# Patient Record
Sex: FEMALE | Race: WHITE | NOT HISPANIC OR LATINO | Employment: FULL TIME | URBAN - METROPOLITAN AREA
[De-identification: names, ages, dates, MRNs, and addresses within clinical notes are randomized per-mention and may not be internally consistent; named-entity substitution may affect disease eponyms.]

---

## 2023-10-28 PROBLEM — J38.1 REINKE'S EDEMA OF VOCAL FOLDS: Status: ACTIVE | Noted: 2023-10-28

## 2023-10-28 PROBLEM — K21.9 GERD (GASTROESOPHAGEAL REFLUX DISEASE): Status: ACTIVE | Noted: 2023-10-28

## 2023-10-28 PROBLEM — J34.89 IRRITATION, NOSE: Status: ACTIVE | Noted: 2023-10-28

## 2023-10-28 PROBLEM — D18.01 HEMANGIOMA OF SKIN AND SUBCUTANEOUS TISSUE: Status: ACTIVE | Noted: 2021-04-01

## 2023-10-28 PROBLEM — L90.5 SCAR CONDITION AND FIBROSIS OF SKIN: Status: ACTIVE | Noted: 2021-04-01

## 2023-10-28 PROBLEM — L81.4 OTHER MELANIN HYPERPIGMENTATION: Status: ACTIVE | Noted: 2021-04-01

## 2023-10-28 PROBLEM — L91.8 OTHER HYPERTROPHIC DISORDERS OF THE SKIN: Status: ACTIVE | Noted: 2021-04-01

## 2023-10-28 PROBLEM — B02.9 HERPES ZOSTER WITHOUT COMPLICATION: Status: ACTIVE | Noted: 2021-04-01

## 2023-10-28 PROBLEM — R49.0 HOARSENESS OF VOICE: Status: ACTIVE | Noted: 2023-10-28

## 2023-10-28 PROBLEM — L82.1 OTHER SEBORRHEIC KERATOSIS: Status: ACTIVE | Noted: 2021-04-01

## 2023-10-28 PROBLEM — J38.7 LEUKOPLAKIA OF LARYNX: Status: ACTIVE | Noted: 2023-10-28

## 2023-10-28 PROBLEM — Z85.828 PERSONAL HISTORY OF OTHER MALIGNANT NEOPLASM OF SKIN: Status: ACTIVE | Noted: 2021-04-01

## 2023-10-28 PROBLEM — J38.3 LESION OF VOCAL CORD: Status: ACTIVE | Noted: 2023-10-28

## 2023-10-28 PROBLEM — L02.11 NECK ABSCESS: Status: ACTIVE | Noted: 2023-10-28

## 2023-10-28 PROBLEM — D22.5 MELANOCYTIC NEVI OF TRUNK: Status: ACTIVE | Noted: 2021-04-01

## 2023-10-28 RX ORDER — OMEPRAZOLE 40 MG/1
1 CAPSULE, DELAYED RELEASE ORAL 2 TIMES DAILY
COMMUNITY
Start: 2019-03-01

## 2023-10-28 RX ORDER — SOD CHLOR,BICARB/SQUEEZ BOTTLE
PACKET, WITH RINSE DEVICE NASAL
COMMUNITY
Start: 2021-10-28 | End: 2023-11-01 | Stop reason: ALTCHOICE

## 2023-11-01 ENCOUNTER — OFFICE VISIT (OUTPATIENT)
Dept: DERMATOLOGY | Facility: CLINIC | Age: 58
End: 2023-11-01
Payer: COMMERCIAL

## 2023-11-01 DIAGNOSIS — D18.01 HEMANGIOMA OF SKIN: ICD-10-CM

## 2023-11-01 DIAGNOSIS — L82.1 SEBORRHEIC KERATOSIS: ICD-10-CM

## 2023-11-01 DIAGNOSIS — Z85.828 PERSONAL HISTORY OF SKIN CANCER: Primary | ICD-10-CM

## 2023-11-01 DIAGNOSIS — L90.5 SCAR CONDITIONS AND FIBROSIS OF SKIN: ICD-10-CM

## 2023-11-01 DIAGNOSIS — L81.4 LENTIGO: ICD-10-CM

## 2023-11-01 DIAGNOSIS — L91.8 SKIN TAG: ICD-10-CM

## 2023-11-01 PROCEDURE — 99213 OFFICE O/P EST LOW 20 MIN: CPT | Performed by: DERMATOLOGY

## 2023-11-01 NOTE — PROGRESS NOTES
Darío Thakkar is a 58 y.o. female who presents for the following: Skin Check and Rash.  Skin Cancer Screening  She has a history of moderate sun exposure. She is in the sun frequently. She uses sunscreen frequently. She reports no skin symptoms. Her moles are not changing.    Spots that concern her: left shoulder (x2) and right leg. Patient states she has a history of SCC        Objective   Well appearing patient in no apparent distress; mood and affect are within normal limits.    A full examination was performed including scalp, head, eyes, ears, nose, lips, neck, chest, axillae, abdomen, back, buttocks, bilateral upper extremities, bilateral lower extremities, hands, feet, fingers, toes, fingernails, and toenails. All findings within normal limits unless otherwise noted below.    Scattered tan macules in sun-exposed areas.    Stuck on verrucous, tan-brown papules and plaques.      Scattered cherry-red papule(s).    Scar is well-healed without evidence of recurrence      Assessment/Plan   Lentigo    The ABCDEs of melanoma and warning signs of non-melanoma skin cancer were discussed with patient and patient expressed understanding. Sun protection and use of at least SPF 30 discussed with patient. Pt instructed to reapply every 2 hours.     Seborrheic keratosis    Hemangioma of skin    Skin tag    Scar conditions and fibrosis of skin      Scribe Attestation  By signing my name below, Leonora CHIANG Scribe   attest that this documentation has been prepared under the direction and in the presence of Allison Langston MD.

## 2024-06-27 ENCOUNTER — APPOINTMENT (OUTPATIENT)
Dept: OTOLARYNGOLOGY | Facility: CLINIC | Age: 59
End: 2024-06-27
Payer: COMMERCIAL

## 2024-08-22 ENCOUNTER — APPOINTMENT (OUTPATIENT)
Dept: OTOLARYNGOLOGY | Facility: CLINIC | Age: 59
End: 2024-08-22
Payer: COMMERCIAL

## 2024-08-22 VITALS — BODY MASS INDEX: 31.58 KG/M2 | HEIGHT: 62 IN | WEIGHT: 171.6 LBS | TEMPERATURE: 97.6 F

## 2024-08-22 DIAGNOSIS — J38.7 LEUKOPLAKIA OF LARYNX: Primary | ICD-10-CM

## 2024-08-22 DIAGNOSIS — R49.0 VOICE HOARSENESS: ICD-10-CM

## 2024-08-22 DIAGNOSIS — J38.1 REINKE'S EDEMA OF VOCAL FOLDS: ICD-10-CM

## 2024-08-22 DIAGNOSIS — R49.8 OTHER VOICE AND RESONANCE DISORDERS: ICD-10-CM

## 2024-08-22 ASSESSMENT — PATIENT HEALTH QUESTIONNAIRE - PHQ9
SUM OF ALL RESPONSES TO PHQ9 QUESTIONS 1 AND 2: 0
2. FEELING DOWN, DEPRESSED OR HOPELESS: NOT AT ALL
1. LITTLE INTEREST OR PLEASURE IN DOING THINGS: NOT AT ALL

## 2024-08-22 NOTE — PROGRESS NOTES
ASSESSMENT AND PLAN:   Audra Thakkar is a 59 y.o. female with a history of history of bilateral leukoplakia, Sb's edema s/p laser surgery. She had benign appearing cysts and vascular changes on the TVCs.       Today's examination to include stroboscopy demonstrates no new concerning masses/lesions. No significant change of leukoplakia. She feels that her voice is stable.     I would like to see the patient back in 1 year. She is taking her PPI and will call for a refill as needed or discuss this with her PCP.         Reason For Consult  No chief complaint on file.    HISTORY OF PRESENT ILLNESS:  Audra Thakkar is a 59 y.o. female presenting for a follow up visit with me for history of bilateral leukoplakia, Sb's edema s/p laser surgery.      The patient reports that she has been doing well. No new voice concerns. She is not taking her PPI prior to her meals.     Last seen 6/29/23: Annual visit for history of bilateral leukoplakia, Sb's edema s/p laser surgery. She had a mucous retention cyst that was benign in appearance.      Past Medical History  She has a past medical history of Personal history of malignant melanoma of skin and Personal history of other diseases of the respiratory system. Surgical History  She has a past surgical history that includes Other surgical history (03/28/2019) and Other surgical history (03/28/2019).   Social History  She has no history on file for tobacco use, alcohol use, and drug use. Allergies  Amoxicillin-pot clavulanate, Clavulanic acid, and Penicillin     Family History  Family History   Problem Relation Name Age of Onset    Testicular cancer Son      Psoriasis Son      Other (viocebox) Other grandmother     Other (esphogus) Other grandmother        Review of Systems  All 10 systems were reviewed and negative except for above.      Last Recorded Vitals  There were no vitals taken for this visit.    Physical Exam  ENT Physical Exam  Constitutional  Appearance: patient  appears well-developed and well-nourished,  Head and Face  Appearance: head appears normal and face appears normal;  Ear  Auricles: right auricle normal; left auricle normal;  Nose  External Nose: nares patent bilaterally;  Oral Cavity/Oropharynx  Lips: normal;  Neck  Neck: neck normal; neck palpation normal;  Respiratory  Inspection: no retractions visible;  Cardiovascular  Inspection: no peripheral edema present;  Neurovestibular  Mental Status: alert and oriented;  Psychiatric: mood normal;  Cranial Nerves: cranial nerves intact;          Procedures   Flexible Laryngoscopy w/ Videostroboscopy    VOICE AND SPEECH CHARACTERISTICS:  Normal spoken speech, (+) mild dysphonia, (+) mild roughness, no breathiness, no asthenia, (+) mild strain.    Intelligibility: normal.   Resonance: balanced.   Vocal Loudness: normal.   Breath Support: normal.    PROCEDURE:    Indications: voice change  PROCEDURE NOTE: FLEXIBLE LARYNGOSCOPY WITH STROBOSCOPY  I recommended a flexible laryngoscopy with stroboscopy based on PE findings, and/or concern for mucosal wave details based upon history and/or for issues associated with hyperreflexic gag on mirror exam concerning for pathology. Risks, benefits, and alternatives were explained. The patient wishes to proceed and gives verbal consent.   Patient is seated in the exam chair. After adequate topical anesthesia, I advance the flexible endoscope. The examination included evaluation of the willis, vallecula, base of tongue, pyriforms, post-cricoid area, larynx and immediate subglottis.  Findings : assessment of the nasopharynx, base of tongue/vallecula, pyriform sinuses, post-cricoid area and pharyngeal walls was without lesion or mass, pharyngeal wall contraction is normal and symmetric, and no pooling of secretions  ventricular obliteration: 4 (complete), erythema/hyperemia: 4 (complete), IA thickenin (moderate), TVC edema: 2 (moderate), and diffuse laryngitis: 3 (severe)  Gross  Arytenoid Movement: symmetric.  Arytenoid Height: normal.   Supraglottic Tension: lateral.  Symmetry: asymmetry.   Amplitude: reduced: left.  Phase Closure: in-phase.  Mucosal Wave Lateral Excursion/Secondary Wave: Bilateral Vocal Cord: mild restriction - Wave moved more than ¼, less than ½ the width of the vocal fold.  Periodicity: aperiodic.  Closure: irregular.      Time Spent  Prep time on day of patient encounter: 10 minutes  Time spent directly with patient, family or caregiver: 15 minutes  Additional Time Spent on Patient Care Activities/Discussion with SLP re care plan: 5 minutes  Documentation Time: 10 minutes  Other Time Spent: 0 minutes  Total: 40 minutes       Scribe Attestation  By signing my name below, I, Leti Wellington , Scribcarly attest that this documentation has been prepared under the direction and in the presence of Tony Varma MD.

## 2024-10-14 ENCOUNTER — LAB (OUTPATIENT)
Dept: LAB | Facility: LAB | Age: 59
End: 2024-10-14

## 2024-10-14 ENCOUNTER — APPOINTMENT (OUTPATIENT)
Dept: PRIMARY CARE | Facility: CLINIC | Age: 59
End: 2024-10-14
Payer: COMMERCIAL

## 2024-10-14 VITALS — SYSTOLIC BLOOD PRESSURE: 92 MMHG | WEIGHT: 173 LBS | BODY MASS INDEX: 31.64 KG/M2 | DIASTOLIC BLOOD PRESSURE: 70 MMHG

## 2024-10-14 DIAGNOSIS — E78.2 MIXED HYPERLIPIDEMIA: Primary | ICD-10-CM

## 2024-10-14 DIAGNOSIS — R73.03 PRE-DIABETES: ICD-10-CM

## 2024-10-14 DIAGNOSIS — Z13.220 LIPID SCREENING: ICD-10-CM

## 2024-10-14 DIAGNOSIS — J38.7 LEUKOPLAKIA OF LARYNX: ICD-10-CM

## 2024-10-14 DIAGNOSIS — K21.9 GASTROESOPHAGEAL REFLUX DISEASE, UNSPECIFIED WHETHER ESOPHAGITIS PRESENT: ICD-10-CM

## 2024-10-14 DIAGNOSIS — R00.2 PALPITATION: ICD-10-CM

## 2024-10-14 DIAGNOSIS — F17.210 CIGARETTE NICOTINE DEPENDENCE WITHOUT COMPLICATION: ICD-10-CM

## 2024-10-14 DIAGNOSIS — Z00.00 HEALTHCARE MAINTENANCE: ICD-10-CM

## 2024-10-14 DIAGNOSIS — E78.2 MIXED HYPERLIPIDEMIA: ICD-10-CM

## 2024-10-14 DIAGNOSIS — Z00.00 HEALTH CARE MAINTENANCE: ICD-10-CM

## 2024-10-14 DIAGNOSIS — F41.9 ANXIETY: ICD-10-CM

## 2024-10-14 LAB
ALBUMIN SERPL BCP-MCNC: 4.6 G/DL (ref 3.4–5)
ALP SERPL-CCNC: 85 U/L (ref 33–136)
ALT SERPL W P-5'-P-CCNC: 20 U/L (ref 7–45)
ANION GAP SERPL CALC-SCNC: 14 MMOL/L (ref 10–20)
AST SERPL W P-5'-P-CCNC: 16 U/L (ref 9–39)
BILIRUB SERPL-MCNC: 0.4 MG/DL (ref 0–1.2)
BUN SERPL-MCNC: 14 MG/DL (ref 6–23)
CALCIUM SERPL-MCNC: 10.1 MG/DL (ref 8.6–10.6)
CHLORIDE SERPL-SCNC: 102 MMOL/L (ref 98–107)
CHOLEST SERPL-MCNC: 234 MG/DL (ref 0–199)
CHOLESTEROL/HDL RATIO: 5.8
CO2 SERPL-SCNC: 29 MMOL/L (ref 21–32)
CREAT SERPL-MCNC: 0.69 MG/DL (ref 0.5–1.05)
EGFRCR SERPLBLD CKD-EPI 2021: >90 ML/MIN/1.73M*2
ERYTHROCYTE [DISTWIDTH] IN BLOOD BY AUTOMATED COUNT: 14.4 % (ref 11.5–14.5)
EST. AVERAGE GLUCOSE BLD GHB EST-MCNC: 123 MG/DL
GLUCOSE SERPL-MCNC: 66 MG/DL (ref 74–99)
HBA1C MFR BLD: 5.9 %
HCT VFR BLD AUTO: 54.2 % (ref 36–46)
HDLC SERPL-MCNC: 40.4 MG/DL
HGB BLD-MCNC: 16.9 G/DL (ref 12–16)
LDLC SERPL CALC-MCNC: 156 MG/DL
MCH RBC QN AUTO: 29.7 PG (ref 26–34)
MCHC RBC AUTO-ENTMCNC: 31.2 G/DL (ref 32–36)
MCV RBC AUTO: 95 FL (ref 80–100)
NON HDL CHOLESTEROL: 194 MG/DL (ref 0–149)
NRBC BLD-RTO: 0 /100 WBCS (ref 0–0)
PLATELET # BLD AUTO: 241 X10*3/UL (ref 150–450)
POTASSIUM SERPL-SCNC: 4.8 MMOL/L (ref 3.5–5.3)
PROT SERPL-MCNC: 7.5 G/DL (ref 6.4–8.2)
RBC # BLD AUTO: 5.69 X10*6/UL (ref 4–5.2)
SODIUM SERPL-SCNC: 140 MMOL/L (ref 136–145)
TRIGL SERPL-MCNC: 189 MG/DL (ref 0–149)
TSH SERPL-ACNC: 1.92 MIU/L (ref 0.44–3.98)
VLDL: 38 MG/DL (ref 0–40)
WBC # BLD AUTO: 12.6 X10*3/UL (ref 4.4–11.3)

## 2024-10-14 PROCEDURE — 85027 COMPLETE CBC AUTOMATED: CPT

## 2024-10-14 PROCEDURE — 36415 COLL VENOUS BLD VENIPUNCTURE: CPT

## 2024-10-14 PROCEDURE — 90656 IIV3 VACC NO PRSV 0.5 ML IM: CPT | Performed by: STUDENT IN AN ORGANIZED HEALTH CARE EDUCATION/TRAINING PROGRAM

## 2024-10-14 PROCEDURE — 83036 HEMOGLOBIN GLYCOSYLATED A1C: CPT

## 2024-10-14 PROCEDURE — 84443 ASSAY THYROID STIM HORMONE: CPT

## 2024-10-14 PROCEDURE — 80061 LIPID PANEL: CPT

## 2024-10-14 PROCEDURE — 80053 COMPREHEN METABOLIC PANEL: CPT

## 2024-10-14 PROCEDURE — 99204 OFFICE O/P NEW MOD 45 MIN: CPT | Performed by: STUDENT IN AN ORGANIZED HEALTH CARE EDUCATION/TRAINING PROGRAM

## 2024-10-14 PROCEDURE — 90471 IMMUNIZATION ADMIN: CPT | Performed by: STUDENT IN AN ORGANIZED HEALTH CARE EDUCATION/TRAINING PROGRAM

## 2024-10-14 ASSESSMENT — PATIENT HEALTH QUESTIONNAIRE - PHQ9
1. LITTLE INTEREST OR PLEASURE IN DOING THINGS: NOT AT ALL
SUM OF ALL RESPONSES TO PHQ9 QUESTIONS 1 AND 2: 0
2. FEELING DOWN, DEPRESSED OR HOPELESS: NOT AT ALL

## 2024-10-14 NOTE — PROGRESS NOTES
"HPI:  Initial PCP visit.    Audra Thakkar is a 59 y.o. female with PMH GERD, basal cell carcinoma, and leukoplakia of larynx s/p surgery (follows with  ENT) here to establish care. Denies any acute complaints. Does note feeling her \"heart racing\" in her throat when she lays down for bed, ongoing for the last few months. Thinks it may be related to stress and anxiety. Denies any CP/SOB/PND.     Accompanied by her .    12-point review of systems reviewed and negative except as mentioned in HPI.     Medications:  Medication Documentation Review Audit       Reviewed by Rizwana Roberson MA (Medical Assistant) on 10/14/24 at 1358      Medication Order Taking? Sig Documenting Provider Last Dose Status   omeprazole (PriLOSEC) 40 mg DR capsule [id number] Yes Take 1 capsule (40 mg) by mouth 2 times a day. Historical Provider, MD Taking Active                    Allergies:  Allergies   Allergen Reactions    Amoxicillin-Pot Clavulanate Unknown    Cephalexin GI Upset and Other     nausea    Clavulanic Acid Itching    Penicillin Unknown       Past medical history:  Past Medical History:   Diagnosis Date    Personal history of malignant melanoma of skin     History of malignant melanoma of skin    Personal history of other diseases of the respiratory system     History of bronchitis       Surgical history:  Past Surgical History:   Procedure Laterality Date    OTHER SURGICAL HISTORY  2019    Tubal ligation    OTHER SURGICAL HISTORY  2019    Tonsillectomy with adenoidectomy       Family history:  Family History   Problem Relation Name Age of Onset    Testicular cancer Son      Psoriasis Son      Other (viocebox) Other grandmother     Other (esphogus) Other grandmother    -Mother:  from lung cancer     Social history:  -Living situation: Lives at home with   -Functional status: Independent  -Does accounting work   -Has 3 sons (1  of overdose, 2 sons alive & local)  -Tobacco: 5-8 cigarettes per day, " "used to smoke 1.5-2ppd  -Alcohol: socially  -Drugs: denies    Vitals:  Vitals:    10/14/24 1356   BP: 92/70       Physical exam:  GEN: conversant, NAD  HEENT: PERRL, EOMI, MMM  NECK: supple, no carotid bruits appreciated b/l  CV: S1, S2, RRR  PULM: CTAB  ABD: soft, NT, ND  NEURO: no new gross focal deficits  EXT: no sig LE edema  PSYCH: appropriate affect    Labs:  No results found for this or any previous visit (from the past 24 hour(s)).    Imaging:  No results found.    Assessment and plan:  #DLD  #Nocturnal palpitations  :: Significant cardiac history in family: father with open heart surgery, brother with MI, brother with stroke age 50-60s  :: RRR on exam, states had prior ischemic eval with stress test & cath \"many years ago\", unable to access that record  :: 02/2023 , ,   -encourage diet & lifestyle modifications  -repeat labs  -cardiology referral     #Prediabetes  :: 02/2023 A1c 6.2%  -encourage diet & lifestyle modifications  -repeat labs    #GERD  -continue omeprazole    #Basal cell carcinoma   #Leukoplakia of larynx   -follows with derm & ENT    #Health Maintenance: routine labs ordered. Longitudinal care.  Cancer:  Colon cancer: FIT negative 03/2024  Mammogram 07/2024: negative  Lung: defers at this time    Vaccines:  Shingles: defers at this time  COVID: received 1x vaccine  Flu: received 10/2024    RTC in 6 months.    Patient discussed with attending physician Dr. Gale who agrees with the plan as outlined above.    Anbael Betancourt MD  Internal Medicine PGY2     Trainee role: Resident    I saw and evaluated the patient. I personally obtained the key and critical portions of the history and physical exam or was physically present for key and critical portions performed by the trainee. I reviewed the trainee's documentation and discussed the patient with the trainee. I agree with the trainee's medical decision making as documented on the trainee's notes.    Jose Maria Gale, " M.D.

## 2024-10-15 DIAGNOSIS — E78.2 MIXED HYPERLIPIDEMIA: Primary | ICD-10-CM

## 2024-10-15 RX ORDER — ROSUVASTATIN CALCIUM 20 MG/1
20 TABLET, COATED ORAL DAILY
Qty: 100 TABLET | Refills: 3 | Status: SHIPPED | OUTPATIENT
Start: 2024-10-15 | End: 2025-11-19

## 2024-11-06 ENCOUNTER — APPOINTMENT (OUTPATIENT)
Dept: CARDIOLOGY | Facility: CLINIC | Age: 59
End: 2024-11-06
Payer: COMMERCIAL

## 2024-11-06 ENCOUNTER — ANCILLARY PROCEDURE (OUTPATIENT)
Dept: CARDIOLOGY | Facility: CLINIC | Age: 59
End: 2024-11-06
Payer: COMMERCIAL

## 2024-11-06 VITALS
HEIGHT: 63 IN | SYSTOLIC BLOOD PRESSURE: 126 MMHG | WEIGHT: 168.2 LBS | BODY MASS INDEX: 29.8 KG/M2 | OXYGEN SATURATION: 94 % | HEART RATE: 97 BPM | DIASTOLIC BLOOD PRESSURE: 70 MMHG

## 2024-11-06 DIAGNOSIS — R07.9 CHEST PAIN, UNSPECIFIED TYPE: Primary | ICD-10-CM

## 2024-11-06 DIAGNOSIS — Z72.0 TOBACCO USE: ICD-10-CM

## 2024-11-06 DIAGNOSIS — R94.31 ABNORMAL EKG: ICD-10-CM

## 2024-11-06 DIAGNOSIS — R00.2 PALPITATION: ICD-10-CM

## 2024-11-06 LAB — BODY SURFACE AREA: 1.83 M2

## 2024-11-06 PROCEDURE — 3008F BODY MASS INDEX DOCD: CPT | Performed by: STUDENT IN AN ORGANIZED HEALTH CARE EDUCATION/TRAINING PROGRAM

## 2024-11-06 PROCEDURE — 99204 OFFICE O/P NEW MOD 45 MIN: CPT | Performed by: STUDENT IN AN ORGANIZED HEALTH CARE EDUCATION/TRAINING PROGRAM

## 2024-11-06 PROCEDURE — 93000 ELECTROCARDIOGRAM COMPLETE: CPT | Performed by: STUDENT IN AN ORGANIZED HEALTH CARE EDUCATION/TRAINING PROGRAM

## 2024-11-06 PROCEDURE — 93242 EXT ECG>48HR<7D RECORDING: CPT

## 2024-11-06 NOTE — PROGRESS NOTES
Referred by Dr. Gale for Palpitations     History Of Present Illness:    Audra Thakkar is a 59 y.o. female past medical history notable for chronic tobacco consumption, prediabetes, GERD and hyperlipidemia referred to us due to palpitations.  Patient has noticed that she has palpitations at night when she sleeps.  These are sporadic episodes she can feel fluttering her heart.  Is unclear if this is related to the position she sleeps.  She denies heavy ethanol consumption.  She is a current smoker and smokes about a carton every 2 weeks.  She has not been sick.  Denies cough fevers or chills.    She has had 2 separate episodes of chest tightness that have happened in the middle of the night.  1 episode woke her up and is unclear if this is related to GERD.  She felt pressure grabbing sensation in her heart.  She has a associated shortness of breath.  This happened 2 separate times.  Most recent labs reviewed.  LDL is 156 and she is now on rosuvastatin 20 mg daily.  Baseline ECG is notable for sinus rhythm with sinus arrhythmia borderline left posterior fascicular block.      Past Medical History:  She has a past medical history of Personal history of malignant melanoma of skin and Personal history of other diseases of the respiratory system.    Past Surgical History:  She has a past surgical history that includes Other surgical history (03/28/2019) and Other surgical history (03/28/2019).      Social History:  She reports that she has been smoking cigarettes. She has never used smokeless tobacco. No history on file for alcohol use and drug use.    Family History:  Family History   Problem Relation Name Age of Onset    Testicular cancer Son      Psoriasis Son      Other (viocebox) Other grandmother     Other (esphogus) Other grandmother         Allergies:  Amoxicillin-pot clavulanate, Cephalexin, Clavulanic acid, and Penicillin    Outpatient Medications:  Current Outpatient Medications   Medication Instructions     "omeprazole (PriLOSEC) 40 mg DR capsule 1 capsule, 2 times daily    rosuvastatin (CRESTOR) 20 mg, oral, Daily        Last Recorded Vitals:  Vitals:    11/06/24 0823   BP: 126/70   BP Location: Left arm   Patient Position: Sitting   BP Cuff Size: Adult   Pulse: 97   SpO2: 94%   Weight: 76.3 kg (168 lb 3.2 oz)   Height: 1.588 m (5' 2.5\")       Physical Exam:  General: No acute distress,  A&O x3  Skin: Warm and dry  Neck: JVD is not elevated  ENT: Moist mucous membranes no lesions appreciated  Pulmonary: Breath sounds are diminished bilaterally but otherwise clear to auscultation  Cards: Regular rate rhythm, no murmurs gallops or rubs appreciated normal S1-S2  Abdomen: Soft nontender nondistended  Extremities: No edema or cyanosis  Psych: Appropriate mood and affect          Last Labs:  CBC -  Lab Results   Component Value Date    WBC 12.6 (H) 10/14/2024    HGB 16.9 (H) 10/14/2024    HCT 54.2 (H) 10/14/2024    MCV 95 10/14/2024     10/14/2024       CMP -  Lab Results   Component Value Date    CALCIUM 10.1 10/14/2024    PHOS 4.5 02/15/2019    PROT 7.5 10/14/2024    ALBUMIN 4.6 10/14/2024    AST 16 10/14/2024    ALT 20 10/14/2024    ALKPHOS 85 10/14/2024    BILITOT 0.4 10/14/2024       LIPID PANEL -   Lab Results   Component Value Date    CHOL 234 (H) 10/14/2024    TRIG 189 (H) 10/14/2024    HDL 40.4 10/14/2024    CHHDL 5.8 10/14/2024    VLDL 38 10/14/2024    NHDL 194 (H) 10/14/2024       RENAL FUNCTION PANEL -   Lab Results   Component Value Date    GLUCOSE 66 (L) 10/14/2024     10/14/2024    K 4.8 10/14/2024     10/14/2024    CO2 29 10/14/2024    ANIONGAP 14 10/14/2024    BUN 14 10/14/2024    CREATININE 0.69 10/14/2024    CALCIUM 10.1 10/14/2024    PHOS 4.5 02/15/2019    ALBUMIN 4.6 10/14/2024        Lab Results   Component Value Date    HGBA1C 5.9 (H) 10/14/2024       Assessment/Plan     Palpitations: Mostly occurring at night.  No clear exacerbating or alleviating factors.  Worsening over the last " several weeks to months.  Baseline ECG reviewed.  Baseline labs reviewed.  For further workup will provide Holter monitor and echocardiogram.  Additional recs to follow based on study findings.    2.  Chest pain: Typical and atypical symptoms.  Potentially related to GERD patient has risk factors which include chronic tobacco consumption, prediabetes and hyperlipidemia.  Baseline ECG reviewed.  Recently placed on rosuvastatin for risk modification.  For further risk stratification we will send patient for stress echocardiogram.  Additional recs will follow based on study findings.    Stress echocardiogram  7-day Holter monitor  Echocardiogram  Follow-up once testing is completed    (This note was generated with voice recognition software and may contain errors including spelling, grammar, syntax and missed recognition of what was dictated, of which may not have been fully corrected)     Ruperto Sandoval MD PhD

## 2024-11-15 DIAGNOSIS — E78.2 MIXED HYPERLIPIDEMIA: ICD-10-CM

## 2024-11-15 RX ORDER — ROSUVASTATIN CALCIUM 20 MG/1
TABLET, COATED ORAL
Refills: 0 | OUTPATIENT
Start: 2024-11-15

## 2024-11-18 DIAGNOSIS — E78.2 MIXED HYPERLIPIDEMIA: ICD-10-CM

## 2024-11-18 RX ORDER — ROSUVASTATIN CALCIUM 20 MG/1
20 TABLET, COATED ORAL DAILY
Qty: 100 TABLET | Refills: 3 | Status: SHIPPED | OUTPATIENT
Start: 2024-11-18 | End: 2025-12-23

## 2024-11-24 LAB — BODY SURFACE AREA: 1.83 M2

## 2024-11-24 PROCEDURE — 93244 EXT ECG>48HR<7D REV&INTERPJ: CPT | Performed by: INTERNAL MEDICINE

## 2024-12-02 DIAGNOSIS — K21.9 GASTROESOPHAGEAL REFLUX DISEASE, UNSPECIFIED WHETHER ESOPHAGITIS PRESENT: ICD-10-CM

## 2024-12-03 ENCOUNTER — HOSPITAL ENCOUNTER (OUTPATIENT)
Dept: CARDIOLOGY | Facility: CLINIC | Age: 59
Discharge: HOME | End: 2024-12-03
Payer: COMMERCIAL

## 2024-12-03 DIAGNOSIS — R00.2 PALPITATION: ICD-10-CM

## 2024-12-03 DIAGNOSIS — R94.31 ABNORMAL EKG: ICD-10-CM

## 2024-12-03 DIAGNOSIS — R07.89 OTHER CHEST PAIN: ICD-10-CM

## 2024-12-03 DIAGNOSIS — R07.9 CHEST PAIN, UNSPECIFIED TYPE: ICD-10-CM

## 2024-12-03 DIAGNOSIS — Z72.0 TOBACCO USE: ICD-10-CM

## 2024-12-03 PROCEDURE — 2500000004 HC RX 250 GENERAL PHARMACY W/ HCPCS (ALT 636 FOR OP/ED): Performed by: STUDENT IN AN ORGANIZED HEALTH CARE EDUCATION/TRAINING PROGRAM

## 2024-12-03 PROCEDURE — 93016 CV STRESS TEST SUPVJ ONLY: CPT | Performed by: STUDENT IN AN ORGANIZED HEALTH CARE EDUCATION/TRAINING PROGRAM

## 2024-12-03 PROCEDURE — 93350 STRESS TTE ONLY: CPT | Performed by: STUDENT IN AN ORGANIZED HEALTH CARE EDUCATION/TRAINING PROGRAM

## 2024-12-03 PROCEDURE — C8929 TTE W OR WO FOL WCON,DOPPLER: HCPCS

## 2024-12-03 PROCEDURE — 93306 TTE W/DOPPLER COMPLETE: CPT | Performed by: STUDENT IN AN ORGANIZED HEALTH CARE EDUCATION/TRAINING PROGRAM

## 2024-12-03 PROCEDURE — 93018 CV STRESS TEST I&R ONLY: CPT | Performed by: STUDENT IN AN ORGANIZED HEALTH CARE EDUCATION/TRAINING PROGRAM

## 2024-12-03 PROCEDURE — C8928 TTE W OR W/O FOL W/CON,STRES: HCPCS

## 2024-12-05 ENCOUNTER — TELEPHONE (OUTPATIENT)
Dept: CARDIOLOGY | Facility: CLINIC | Age: 59
End: 2024-12-05
Payer: COMMERCIAL

## 2024-12-05 NOTE — TELEPHONE ENCOUNTER
----- Message from Ruperto Sandoval sent at 12/4/2024  4:25 PM EST -----  Notify patient of normal stress test results. Audra Thakkar  can follow up as scheduled.

## 2024-12-06 LAB
AORTIC VALVE MEAN GRADIENT: 3 MMHG
AORTIC VALVE PEAK VELOCITY: 1.33 M/S
AV PEAK GRADIENT: 7 MMHG
AVA (PEAK VEL): 2.15 CM2
AVA (VTI): 2.48 CM2
EJECTION FRACTION APICAL 4 CHAMBER: 67.5
EJECTION FRACTION: 58 %
LEFT VENTRICLE INTERNAL DIMENSION DIASTOLE: 4.16 CM (ref 3.5–6)
LEFT VENTRICULAR OUTFLOW TRACT DIAMETER: 1.96 CM
MITRAL VALVE E/A RATIO: 0.81
RIGHT VENTRICLE FREE WALL PEAK S': 12 CM/S
TRICUSPID ANNULAR PLANE SYSTOLIC EXCURSION: 2.5 CM

## 2024-12-10 RX ORDER — OMEPRAZOLE 40 MG/1
40 CAPSULE, DELAYED RELEASE ORAL 2 TIMES DAILY
Qty: 180 CAPSULE | Refills: 0 | Status: SHIPPED | OUTPATIENT
Start: 2024-12-10 | End: 2025-03-10

## 2024-12-11 RX ORDER — OMEPRAZOLE 40 MG/1
CAPSULE, DELAYED RELEASE ORAL
Refills: 0 | OUTPATIENT
Start: 2024-12-11

## 2025-02-26 ENCOUNTER — APPOINTMENT (OUTPATIENT)
Dept: CARDIOLOGY | Facility: CLINIC | Age: 60
End: 2025-02-26
Payer: COMMERCIAL

## 2025-02-26 VITALS
BODY MASS INDEX: 31.1 KG/M2 | WEIGHT: 169 LBS | SYSTOLIC BLOOD PRESSURE: 142 MMHG | DIASTOLIC BLOOD PRESSURE: 88 MMHG | HEART RATE: 85 BPM | HEIGHT: 62 IN | OXYGEN SATURATION: 93 %

## 2025-02-26 DIAGNOSIS — Z72.0 TOBACCO USE: ICD-10-CM

## 2025-02-26 DIAGNOSIS — R00.2 PALPITATION: ICD-10-CM

## 2025-02-26 DIAGNOSIS — I10 ESSENTIAL HYPERTENSION: ICD-10-CM

## 2025-02-26 DIAGNOSIS — R07.9 CHEST PAIN, UNSPECIFIED TYPE: Primary | ICD-10-CM

## 2025-02-26 DIAGNOSIS — R94.31 ABNORMAL EKG: ICD-10-CM

## 2025-02-26 DIAGNOSIS — I47.10 PAROXYSMAL SUPRAVENTRICULAR TACHYCARDIA (CMS-HCC): ICD-10-CM

## 2025-02-26 PROCEDURE — 3008F BODY MASS INDEX DOCD: CPT | Performed by: STUDENT IN AN ORGANIZED HEALTH CARE EDUCATION/TRAINING PROGRAM

## 2025-02-26 PROCEDURE — 93000 ELECTROCARDIOGRAM COMPLETE: CPT | Performed by: STUDENT IN AN ORGANIZED HEALTH CARE EDUCATION/TRAINING PROGRAM

## 2025-02-26 PROCEDURE — 3079F DIAST BP 80-89 MM HG: CPT | Performed by: STUDENT IN AN ORGANIZED HEALTH CARE EDUCATION/TRAINING PROGRAM

## 2025-02-26 PROCEDURE — 3077F SYST BP >= 140 MM HG: CPT | Performed by: STUDENT IN AN ORGANIZED HEALTH CARE EDUCATION/TRAINING PROGRAM

## 2025-02-26 PROCEDURE — 99214 OFFICE O/P EST MOD 30 MIN: CPT | Performed by: STUDENT IN AN ORGANIZED HEALTH CARE EDUCATION/TRAINING PROGRAM

## 2025-02-26 NOTE — PROGRESS NOTES
"Chief Complaint:   Chest Pain and Palpitations (Follow up after testing and holter)     History Of Present Illness:    Audra Thakkar is a 59 y.o. female returns for follow-up appointment.  Patient wore Holter monitor which showed no evidence of atrial fibrillation but she had episodes of paroxysmal SVT.  She knows her heart rate will speed up at times very briefly but she states the symptoms do not impact her daily living.  She underwent stress echocardiogram at which time she is able to exercise 2 to 4 minutes achieving appropriate max and no evidence of ischemia by EKG or echocardiogram.  She has a episodes of on and off atypical chest discomfort.  Today she had chest discomfort and her EKG was notable for sinus rhythm she has had bouts of bronchitis more recently     Last Recorded Vitals:  Vitals:    02/26/25 0943   BP: 142/88   BP Location: Left arm   Patient Position: Sitting   BP Cuff Size: Adult   Pulse: 85   SpO2: 93%   Weight: 76.7 kg (169 lb)   Height: 1.575 m (5' 2\")       Review of Systems  ROS      Allergies:  Amoxicillin-pot clavulanate, Cephalexin, Clavulanic acid, and Penicillin    Outpatient Medications:  Current Outpatient Medications   Medication Instructions    omeprazole (PRILOSEC) 40 mg, oral, 2 times daily    rosuvastatin (CRESTOR) 20 mg, oral, Daily       Physical Exam:  General: No acute distress,  A&O x3  Skin: Warm and dry  Neck: JVD is not elevated  ENT: Moist mucous membranes no lesions appreciated  Pulmonary: CTAB  Cards: Regular rate rhythm, no murmurs gallops or rubs appreciated normal S1-S2  Abdomen: Soft nontender nondistended  Extremities: No edema or cyanosis  Psych: Appropriate mood and affect        Last Labs:  CBC -  Lab Results   Component Value Date    WBC 12.6 (H) 10/14/2024    HGB 16.9 (H) 10/14/2024    HCT 54.2 (H) 10/14/2024    MCV 95 10/14/2024     10/14/2024       CMP -  Lab Results   Component Value Date    CALCIUM 10.1 10/14/2024    PHOS 4.5 02/15/2019    PROT " 7.5 10/14/2024    ALBUMIN 4.6 10/14/2024    AST 16 10/14/2024    ALT 20 10/14/2024    ALKPHOS 85 10/14/2024    BILITOT 0.4 10/14/2024       LIPID PANEL -   Lab Results   Component Value Date    CHOL 234 (H) 10/14/2024    TRIG 189 (H) 10/14/2024    HDL 40.4 10/14/2024    CHHDL 5.8 10/14/2024    VLDL 38 10/14/2024    NHDL 194 (H) 10/14/2024       RENAL FUNCTION PANEL -   Lab Results   Component Value Date    GLUCOSE 66 (L) 10/14/2024     10/14/2024    K 4.8 10/14/2024     10/14/2024    CO2 29 10/14/2024    ANIONGAP 14 10/14/2024    BUN 14 10/14/2024    CREATININE 0.69 10/14/2024    CALCIUM 10.1 10/14/2024    PHOS 4.5 02/15/2019    ALBUMIN 4.6 10/14/2024        Lab Results   Component Value Date    HGBA1C 5.9 (H) 10/14/2024       Last Cardiology Tests:  ECG:  Encounter Date: 11/06/24   ECG 12 lead (Clinic Performed)    Narrative    Sinus rhythm with sinus arrhythmia and borderline left posterior   fascicular block.        Echo:  No results found for this or any previous visit from the past 1095 days.       Ejection Fractions:  EF   Date/Time Value Ref Range Status   12/03/2024 02:45 PM 58 %      Assessment and Plan    Paroxysmal SVT: Longest documented episode of 11 beats.  Mostly occurring at night.  No clear exacerbating or alleviating factors.  Worsening over the last several weeks to months.  Baseline ECG reviewed.  Baseline labs reviewed.  Stress test results reviewed.  Offered pharmacotherapy for which patient declined.       2.  Chest pain: Typical and atypical symptoms.  Potentially related to GERD patient has risk factors which include chronic tobacco consumption, prediabetes and hyperlipidemia.  Baseline ECG reviewed.  Recently placed on rosuvastatin for risk modification.    -Stress echocardiogram showed limited functional capacity but able to achieve 4 METS and no ischemia by EKG or stress echocardiogram.  -EKG today shows normal sinus rhythm  -Calcium score ordered    3.  Tobacco consumption:  Precontemplative.  Advised smoking cessation    Follow-up in 6     (This note was generated with voice recognition software and may contain errors including spelling, grammar, syntax and missed recognition of what was dictated, of which may not have been fully corrected)     Ruperto Sandoval MD PhD

## 2025-03-11 ENCOUNTER — HOSPITAL ENCOUNTER (OUTPATIENT)
Dept: RADIOLOGY | Facility: CLINIC | Age: 60
Discharge: HOME | End: 2025-03-11
Payer: COMMERCIAL

## 2025-03-11 DIAGNOSIS — R00.2 PALPITATION: ICD-10-CM

## 2025-03-11 DIAGNOSIS — R07.9 CHEST PAIN, UNSPECIFIED TYPE: ICD-10-CM

## 2025-03-11 DIAGNOSIS — I10 ESSENTIAL HYPERTENSION: ICD-10-CM

## 2025-03-11 PROCEDURE — 75571 CT HRT W/O DYE W/CA TEST: CPT

## 2025-03-26 ENCOUNTER — TELEPHONE (OUTPATIENT)
Dept: DERMATOLOGY | Facility: CLINIC | Age: 60
End: 2025-03-26
Payer: COMMERCIAL

## 2025-03-26 DIAGNOSIS — B00.9 HERPESVIRAL INFECTION: Primary | ICD-10-CM

## 2025-03-26 RX ORDER — VALACYCLOVIR HYDROCHLORIDE 1 G/1
1000 TABLET, FILM COATED ORAL 2 TIMES DAILY
Qty: 14 TABLET | Refills: 0 | Status: SHIPPED | OUTPATIENT
Start: 2025-03-26 | End: 2025-04-02

## 2025-03-26 NOTE — TELEPHONE ENCOUNTER
Called pt back in regards to VM left. Pt recently contracted COVID last week and multiple cold sores popped up. Confirmed current medications and pharmacy with pt. Pt stated that she is using abreva cold sore cream which has helped slightly. Informed pt I will let Dr. Langston know everything that has been discussed

## 2025-04-11 DIAGNOSIS — K21.9 GASTROESOPHAGEAL REFLUX DISEASE, UNSPECIFIED WHETHER ESOPHAGITIS PRESENT: ICD-10-CM

## 2025-04-14 ENCOUNTER — APPOINTMENT (OUTPATIENT)
Dept: PRIMARY CARE | Facility: CLINIC | Age: 60
End: 2025-04-14

## 2025-04-14 VITALS — SYSTOLIC BLOOD PRESSURE: 112 MMHG | DIASTOLIC BLOOD PRESSURE: 70 MMHG

## 2025-04-14 DIAGNOSIS — E78.2 MIXED HYPERLIPIDEMIA: ICD-10-CM

## 2025-04-14 DIAGNOSIS — F17.210 CIGARETTE NICOTINE DEPENDENCE WITHOUT COMPLICATION: Primary | ICD-10-CM

## 2025-04-14 DIAGNOSIS — R73.03 PRE-DIABETES: ICD-10-CM

## 2025-04-14 DIAGNOSIS — J43.9 PULMONARY EMPHYSEMA, UNSPECIFIED EMPHYSEMA TYPE (MULTI): ICD-10-CM

## 2025-04-14 DIAGNOSIS — K21.9 GASTROESOPHAGEAL REFLUX DISEASE, UNSPECIFIED WHETHER ESOPHAGITIS PRESENT: ICD-10-CM

## 2025-04-14 PROCEDURE — 99214 OFFICE O/P EST MOD 30 MIN: CPT | Performed by: STUDENT IN AN ORGANIZED HEALTH CARE EDUCATION/TRAINING PROGRAM

## 2025-04-14 PROCEDURE — 3078F DIAST BP <80 MM HG: CPT | Performed by: STUDENT IN AN ORGANIZED HEALTH CARE EDUCATION/TRAINING PROGRAM

## 2025-04-14 PROCEDURE — 3074F SYST BP LT 130 MM HG: CPT | Performed by: STUDENT IN AN ORGANIZED HEALTH CARE EDUCATION/TRAINING PROGRAM

## 2025-04-14 RX ORDER — OMEPRAZOLE 40 MG/1
40 CAPSULE, DELAYED RELEASE ORAL 2 TIMES DAILY
Qty: 180 CAPSULE | Refills: 1 | Status: SHIPPED | OUTPATIENT
Start: 2025-04-14 | End: 2025-10-11

## 2025-04-14 NOTE — PROGRESS NOTES
Subjective   Patient ID: Audra Thakkar is a 59 y.o. female who presents for Follow-up.    HPI   Here for follow up.  Has been tolerating the statin, no muscle aches.  States had prior PFTs that were normal, does not use inhaler  Gets exercise walking outside and on treadmilll and tries to get steps in at work.    Accompanied by her .        Review of Systems  12 point ROS reviewed and otherwise negative except as stated above.      Objective   /70     Physical Exam  Constitutional: Well-developed female in no acute distress.  HEENT: NCAT. EOMI.   Respiratory: CTAB. No wheezes, crackles, or rhonchi. Normal respiratory effort on RA.  Cardiovascular: RRR, normal S1/S2, No murmurs, rubs, or gallops.   Neuro: CN II-XII grossly intact. Moving all extremities with no focal deficits  MSK: WWP, no peripheral edema  Skin: No lesions, wounds, or bruising  Psych: Appropriate mood and affect.      Assessment/Plan     #DLD/#Nocturnal palpitations: Seen by cardiology. CAC score 66 in 2025, continue rosuvastatin. Repeat LFT/Lipid panel.    #Emphysema: Seen on CAC score. Referral to pulmonology placed.     #Prediabetes:  A1c 5.9. -encourage diet & lifestyle modifications     #GERD -continue omeprazole     #Basal cell carcinoma /#Leukoplakia of larynx -follows with derm & ENT     #Health Maintenance: routine labs ordered. Longitudinal care.  Cancer:  Colon cancer: FIT negative 03/2024  Mammogram 07/2024: negative  Lung: defers at this time     Vaccines:  Shingles: defers at this time  COVID: received 1x vaccine  Flu: received 10/2024     RTC in 6 months.    Soha Guerrero MD  PGY-2 Internal Medicine    Trainee role: Resident    I saw and evaluated the patient. I personally obtained the key and critical portions of the history and physical exam or was physically present for key and critical portions performed by the trainee. I reviewed the trainee's documentation and discussed the patient with the trainee. I agree with  the trainee's medical decision making as documented on the trainee's notes.    Jose Maria Gale M.D.

## 2025-04-15 LAB
ALBUMIN SERPL-MCNC: 4.6 G/DL (ref 3.6–5.1)
ALP SERPL-CCNC: 74 U/L (ref 37–153)
ALT SERPL-CCNC: 16 U/L (ref 6–29)
ANION GAP SERPL CALCULATED.4IONS-SCNC: 16 MMOL/L (CALC) (ref 7–17)
AST SERPL-CCNC: 19 U/L (ref 10–35)
BILIRUB SERPL-MCNC: 0.3 MG/DL (ref 0.2–1.2)
BUN SERPL-MCNC: 14 MG/DL (ref 7–25)
CALCIUM SERPL-MCNC: 9.4 MG/DL (ref 8.6–10.4)
CHLORIDE SERPL-SCNC: 106 MMOL/L (ref 98–110)
CHOLEST SERPL-MCNC: 146 MG/DL
CHOLEST/HDLC SERPL: 3.2 (CALC)
CO2 SERPL-SCNC: 20 MMOL/L (ref 20–32)
CREAT SERPL-MCNC: 0.58 MG/DL (ref 0.5–1.05)
EGFRCR SERPLBLD CKD-EPI 2021: 104 ML/MIN/1.73M2
GLUCOSE SERPL-MCNC: 98 MG/DL (ref 65–139)
HDLC SERPL-MCNC: 46 MG/DL
LDLC SERPL CALC-MCNC: 75 MG/DL (CALC)
NONHDLC SERPL-MCNC: 100 MG/DL (CALC)
POTASSIUM SERPL-SCNC: 4.1 MMOL/L (ref 3.5–5.3)
PROT SERPL-MCNC: 6.9 G/DL (ref 6.1–8.1)
SODIUM SERPL-SCNC: 142 MMOL/L (ref 135–146)
TRIGL SERPL-MCNC: 145 MG/DL

## 2025-04-18 RX ORDER — OMEPRAZOLE 40 MG/1
40 CAPSULE, DELAYED RELEASE ORAL 2 TIMES DAILY
Qty: 180 CAPSULE | Refills: 3 | Status: SHIPPED | OUTPATIENT
Start: 2025-04-18

## 2025-08-19 PROBLEM — E66.811 CLASS 1 OBESITY WITH BODY MASS INDEX (BMI) OF 30.0 TO 30.9 IN ADULT: Status: ACTIVE | Noted: 2025-08-19

## 2025-08-19 PROBLEM — R93.1 AGATSTON CORONARY ARTERY CALCIUM SCORE LESS THAN 100: Status: ACTIVE | Noted: 2025-08-19

## 2025-08-20 ENCOUNTER — APPOINTMENT (OUTPATIENT)
Dept: CARDIOLOGY | Facility: CLINIC | Age: 60
End: 2025-08-20
Payer: COMMERCIAL

## 2025-08-20 VITALS
DIASTOLIC BLOOD PRESSURE: 74 MMHG | OXYGEN SATURATION: 95 % | SYSTOLIC BLOOD PRESSURE: 132 MMHG | HEIGHT: 63 IN | WEIGHT: 170.2 LBS | BODY MASS INDEX: 30.16 KG/M2 | HEART RATE: 86 BPM

## 2025-08-20 DIAGNOSIS — I47.10 PAROXYSMAL SUPRAVENTRICULAR TACHYCARDIA: ICD-10-CM

## 2025-08-20 DIAGNOSIS — R93.1 AGATSTON CORONARY ARTERY CALCIUM SCORE LESS THAN 100: ICD-10-CM

## 2025-08-20 DIAGNOSIS — I10 ESSENTIAL HYPERTENSION: Primary | ICD-10-CM

## 2025-08-20 DIAGNOSIS — Z72.0 TOBACCO USE: ICD-10-CM

## 2025-08-20 PROCEDURE — 3078F DIAST BP <80 MM HG: CPT | Performed by: STUDENT IN AN ORGANIZED HEALTH CARE EDUCATION/TRAINING PROGRAM

## 2025-08-20 PROCEDURE — 3008F BODY MASS INDEX DOCD: CPT | Performed by: STUDENT IN AN ORGANIZED HEALTH CARE EDUCATION/TRAINING PROGRAM

## 2025-08-20 PROCEDURE — 3075F SYST BP GE 130 - 139MM HG: CPT | Performed by: STUDENT IN AN ORGANIZED HEALTH CARE EDUCATION/TRAINING PROGRAM

## 2025-08-20 PROCEDURE — 99213 OFFICE O/P EST LOW 20 MIN: CPT | Performed by: STUDENT IN AN ORGANIZED HEALTH CARE EDUCATION/TRAINING PROGRAM

## 2025-08-21 ENCOUNTER — APPOINTMENT (OUTPATIENT)
Dept: OTOLARYNGOLOGY | Facility: CLINIC | Age: 60
End: 2025-08-21
Payer: COMMERCIAL

## 2025-10-13 ENCOUNTER — APPOINTMENT (OUTPATIENT)
Dept: PRIMARY CARE | Facility: CLINIC | Age: 60
End: 2025-10-13
Payer: COMMERCIAL